# Patient Record
Sex: MALE | HISPANIC OR LATINO
[De-identification: names, ages, dates, MRNs, and addresses within clinical notes are randomized per-mention and may not be internally consistent; named-entity substitution may affect disease eponyms.]

---

## 2020-03-06 ENCOUNTER — TRANSCRIPTION ENCOUNTER (OUTPATIENT)
Age: 50
End: 2020-03-06

## 2020-11-11 ENCOUNTER — TRANSCRIPTION ENCOUNTER (OUTPATIENT)
Age: 50
End: 2020-11-11

## 2021-03-02 ENCOUNTER — TRANSCRIPTION ENCOUNTER (OUTPATIENT)
Age: 51
End: 2021-03-02

## 2021-03-15 ENCOUNTER — TRANSCRIPTION ENCOUNTER (OUTPATIENT)
Age: 51
End: 2021-03-15

## 2021-03-29 ENCOUNTER — TRANSCRIPTION ENCOUNTER (OUTPATIENT)
Age: 51
End: 2021-03-29

## 2023-08-28 ENCOUNTER — APPOINTMENT (OUTPATIENT)
Dept: ORTHOPEDIC SURGERY | Facility: CLINIC | Age: 53
End: 2023-08-28
Payer: COMMERCIAL

## 2023-08-28 DIAGNOSIS — M25.569 PAIN IN UNSPECIFIED KNEE: ICD-10-CM

## 2023-08-28 DIAGNOSIS — M25.562 PAIN IN LEFT KNEE: ICD-10-CM

## 2023-08-28 DIAGNOSIS — M23.92 UNSPECIFIED INTERNAL DERANGEMENT OF LEFT KNEE: ICD-10-CM

## 2023-08-28 PROBLEM — Z00.00 ENCOUNTER FOR PREVENTIVE HEALTH EXAMINATION: Status: ACTIVE | Noted: 2023-08-28

## 2023-08-28 PROCEDURE — 20611 DRAIN/INJ JOINT/BURSA W/US: CPT | Mod: LT

## 2023-08-28 PROCEDURE — 99203 OFFICE O/P NEW LOW 30 MIN: CPT | Mod: 25

## 2023-08-28 PROCEDURE — 73562 X-RAY EXAM OF KNEE 3: CPT | Mod: LT

## 2023-08-28 NOTE — PHYSICAL EXAM
[de-identified] : XRAY LEFT KNEE: 4 views of the knee GRADE 1 OSTEOARTHRITIS MEDIAL AND PF  No obvious fracture or dislocation.  Alignment within normal limits

## 2023-08-28 NOTE — HISTORY OF PRESENT ILLNESS
[de-identified] : LOCATION: LEFT KNEE  DURATION: THURSDAY 8/24/23 CONTEXT: HIT HIMSELF BUT NOT SURE WHERE  QUALITY: SHARP, SULL ACHE  RADIATING PAIN DOWN LEG SOMETIMES  INTERMITTENT  PAIN LEVEL: 8/10 BETTER WITH ICE,TYLENOL, REST  WORSE WITH WALKING  ASSOCIATED SYMPTOMS: PINCHING   PT LONG TIME AGO- HELPFUL

## 2023-08-28 NOTE — DISCUSSION/SUMMARY
[de-identified] :  LITTLE RELIEF FROM NSAIDS , EXERCISES  PATIENT HAS ELECTED TO PROCEED WITH KENALOG INJECTION KNEE   RISKS AND BENEFITS DISCUSSED - VERBAL CONSENT OBTAINED  SEE PROCEDURE NOTE  POST INJECTION INSTRUCTIONS:  INJECTION THERAPY HANDOUT PROVIDED  COLD THERAPY , ANALGESICS PRN   ELASTIC KNEE SUPPORT PRN SUPPORTIVE SHOES WITH ARCH SUPPORT   PHYSICAL THERAPY 2 X 4-6 WEEKS - REFERRAL PROVIDED PROGRESS TO HOME EXERCISES

## 2023-08-28 NOTE — PROCEDURE
[de-identified] : INJECTION ASPIRATION LEFT KNEE  Patient has demonstrated limited relief from NSAIDS, rest, exercises / PT , and after discussion of the risks and benefits, the patient has elected to proceed with a n ULTRASOUND GUIDED corticosteroid injection into the LEFT ANTEROMEDIAL   Knee   Confirmed that the patient does not have history of prior adverse reactions, active, infections, or relevant allergies. There was no effusion, erythema, or warmth, and the skin was clear  The skin was sterilized with alcohol. Ethyl Chloride was used as a topical anesthetic. Routine sterile technique.    The KNEE JOINT  was injected utilizing ULTRASOUND GUIDANCEwith KENALOG + LIDOCAINE. The injection was completed without complication and a bandage was applied.  The patient tolerated the procedure well and was given post-injection instructions.Rec: Cold therapy, analgesics, avoid heavy activity.  MEDICATION: Lidocaine 1% 4cc + 10mg of Kenalog LOT# [id number]  EXP 08/24  EFFUSION ASPIRATED: NONE